# Patient Record
Sex: FEMALE
[De-identification: names, ages, dates, MRNs, and addresses within clinical notes are randomized per-mention and may not be internally consistent; named-entity substitution may affect disease eponyms.]

---

## 2017-09-15 ENCOUNTER — CHARTING TRANS (OUTPATIENT)
Dept: OTHER | Age: 82
End: 2017-09-15

## 2017-10-05 ENCOUNTER — HOSPITAL (OUTPATIENT)
Dept: OTHER | Age: 82
End: 2017-10-05
Attending: FAMILY MEDICINE

## 2018-11-02 VITALS
DIASTOLIC BLOOD PRESSURE: 70 MMHG | TEMPERATURE: 98.4 F | SYSTOLIC BLOOD PRESSURE: 118 MMHG | HEART RATE: 72 BPM | RESPIRATION RATE: 18 BRPM

## 2023-01-07 ENCOUNTER — APPOINTMENT (OUTPATIENT)
Dept: GENERAL RADIOLOGY | Age: 88
End: 2023-01-07
Payer: MEDICARE

## 2023-01-07 ENCOUNTER — APPOINTMENT (OUTPATIENT)
Dept: CT IMAGING | Age: 88
End: 2023-01-07
Payer: MEDICARE

## 2023-01-07 ENCOUNTER — HOSPITAL ENCOUNTER (EMERGENCY)
Age: 88
Discharge: HOME | End: 2023-01-07
Attending: STUDENT IN AN ORGANIZED HEALTH CARE EDUCATION/TRAINING PROGRAM
Payer: MEDICARE

## 2023-01-07 VITALS
BODY MASS INDEX: 20.91 KG/M2 | HEART RATE: 96 BPM | SYSTOLIC BLOOD PRESSURE: 145 MMHG | TEMPERATURE: 98.2 F | HEIGHT: 63 IN | DIASTOLIC BLOOD PRESSURE: 76 MMHG | RESPIRATION RATE: 16 BRPM | OXYGEN SATURATION: 95 % | WEIGHT: 118 LBS

## 2023-01-07 DIAGNOSIS — S61.411D LACERATION OF RIGHT HAND, FOREIGN BODY PRESENCE UNSPECIFIED, SUBSEQUENT ENCOUNTER: ICD-10-CM

## 2023-01-07 DIAGNOSIS — T83.511A URINARY TRACT INFECTION ASSOCIATED WITH CATHETERIZATION OF URINARY TRACT, UNSPECIFIED INDWELLING URINARY CATHETER TYPE, INITIAL ENCOUNTER (HCC): Primary | ICD-10-CM

## 2023-01-07 DIAGNOSIS — I44.7 LEFT BUNDLE BRANCH BLOCK: ICD-10-CM

## 2023-01-07 DIAGNOSIS — S42.431A CLOSED DISPLACED FRACTURE OF LATERAL EPICONDYLE OF RIGHT HUMERUS, UNSPECIFIED FRACTURE MORPHOLOGY, INITIAL ENCOUNTER: ICD-10-CM

## 2023-01-07 DIAGNOSIS — N39.0 URINARY TRACT INFECTION ASSOCIATED WITH CATHETERIZATION OF URINARY TRACT, UNSPECIFIED INDWELLING URINARY CATHETER TYPE, INITIAL ENCOUNTER (HCC): Primary | ICD-10-CM

## 2023-01-07 LAB
A/G RATIO: 1.3 (ref 1.1–2.2)
ALBUMIN SERPL-MCNC: 3.5 G/DL (ref 3.4–5)
ALP BLD-CCNC: 95 U/L (ref 40–129)
ALT SERPL-CCNC: 64 U/L (ref 10–40)
ANION GAP SERPL CALCULATED.3IONS-SCNC: 8 MMOL/L (ref 3–16)
AST SERPL-CCNC: 69 U/L (ref 15–37)
BACTERIA: ABNORMAL /HPF
BASOPHILS ABSOLUTE: 0 K/UL (ref 0–0.2)
BASOPHILS RELATIVE PERCENT: 0.5 %
BILIRUB SERPL-MCNC: 0.4 MG/DL (ref 0–1)
BILIRUBIN URINE: NEGATIVE
BLOOD, URINE: NEGATIVE
BUN BLDV-MCNC: 25 MG/DL (ref 7–20)
CALCIUM SERPL-MCNC: 8.8 MG/DL (ref 8.3–10.6)
CHLORIDE BLD-SCNC: 104 MMOL/L (ref 99–110)
CLARITY: CLEAR
CO2: 28 MMOL/L (ref 21–32)
COLOR: YELLOW
CREAT SERPL-MCNC: 0.8 MG/DL (ref 0.6–1.2)
EOSINOPHILS ABSOLUTE: 0.1 K/UL (ref 0–0.6)
EOSINOPHILS RELATIVE PERCENT: 0.9 %
EPITHELIAL CELLS, UA: ABNORMAL /HPF (ref 0–5)
GFR SERPL CREATININE-BSD FRML MDRD: >60 ML/MIN/{1.73_M2}
GLUCOSE BLD-MCNC: 113 MG/DL (ref 70–99)
GLUCOSE BLD-MCNC: 125 MG/DL (ref 70–99)
GLUCOSE URINE: NEGATIVE MG/DL
HCT VFR BLD CALC: 35 % (ref 36–48)
HEMOGLOBIN: 11.4 G/DL (ref 12–16)
KETONES, URINE: NEGATIVE MG/DL
LACTIC ACID: 1.3 MMOL/L (ref 0.4–2)
LEUKOCYTE ESTERASE, URINE: ABNORMAL
LYMPHOCYTES ABSOLUTE: 1 K/UL (ref 1–5.1)
LYMPHOCYTES RELATIVE PERCENT: 11 %
MCH RBC QN AUTO: 29.4 PG (ref 26–34)
MCHC RBC AUTO-ENTMCNC: 32.6 G/DL (ref 31–36)
MCV RBC AUTO: 90.2 FL (ref 80–100)
MICROSCOPIC EXAMINATION: YES
MONOCYTES ABSOLUTE: 0.8 K/UL (ref 0–1.3)
MONOCYTES RELATIVE PERCENT: 8.2 %
NEUTROPHILS ABSOLUTE: 7.6 K/UL (ref 1.7–7.7)
NEUTROPHILS RELATIVE PERCENT: 79.4 %
NITRITE, URINE: NEGATIVE
PDW BLD-RTO: 18.1 % (ref 12.4–15.4)
PERFORMED ON: ABNORMAL
PH UA: 6 (ref 5–8)
PLATELET # BLD: 209 K/UL (ref 135–450)
PMV BLD AUTO: 9.7 FL (ref 5–10.5)
POTASSIUM REFLEX MAGNESIUM: 3.9 MMOL/L (ref 3.5–5.1)
PROTEIN UA: NEGATIVE MG/DL
RBC # BLD: 3.88 M/UL (ref 4–5.2)
RBC UA: ABNORMAL /HPF (ref 0–4)
SODIUM BLD-SCNC: 140 MMOL/L (ref 136–145)
SPECIFIC GRAVITY UA: >=1.03 (ref 1–1.03)
TOTAL PROTEIN: 6.3 G/DL (ref 6.4–8.2)
URINE REFLEX TO CULTURE: YES
URINE TYPE: ABNORMAL
UROBILINOGEN, URINE: 0.2 E.U./DL
WBC # BLD: 9.5 K/UL (ref 4–11)
WBC UA: ABNORMAL /HPF (ref 0–5)

## 2023-01-07 PROCEDURE — 2580000003 HC RX 258

## 2023-01-07 PROCEDURE — 93005 ELECTROCARDIOGRAM TRACING: CPT

## 2023-01-07 PROCEDURE — 70450 CT HEAD/BRAIN W/O DYE: CPT

## 2023-01-07 PROCEDURE — 96366 THER/PROPH/DIAG IV INF ADDON: CPT

## 2023-01-07 PROCEDURE — 73522 X-RAY EXAM HIPS BI 3-4 VIEWS: CPT

## 2023-01-07 PROCEDURE — 80053 COMPREHEN METABOLIC PANEL: CPT

## 2023-01-07 PROCEDURE — 6360000002 HC RX W HCPCS

## 2023-01-07 PROCEDURE — 36415 COLL VENOUS BLD VENIPUNCTURE: CPT

## 2023-01-07 PROCEDURE — 73090 X-RAY EXAM OF FOREARM: CPT

## 2023-01-07 PROCEDURE — 73080 X-RAY EXAM OF ELBOW: CPT

## 2023-01-07 PROCEDURE — 99285 EMERGENCY DEPT VISIT HI MDM: CPT

## 2023-01-07 PROCEDURE — 73130 X-RAY EXAM OF HAND: CPT

## 2023-01-07 PROCEDURE — 85025 COMPLETE CBC W/AUTO DIFF WBC: CPT

## 2023-01-07 PROCEDURE — 73030 X-RAY EXAM OF SHOULDER: CPT

## 2023-01-07 PROCEDURE — 96365 THER/PROPH/DIAG IV INF INIT: CPT

## 2023-01-07 PROCEDURE — 87086 URINE CULTURE/COLONY COUNT: CPT

## 2023-01-07 PROCEDURE — 81001 URINALYSIS AUTO W/SCOPE: CPT

## 2023-01-07 PROCEDURE — 83605 ASSAY OF LACTIC ACID: CPT

## 2023-01-07 RX ORDER — SODIUM CHLORIDE, SODIUM LACTATE, POTASSIUM CHLORIDE, AND CALCIUM CHLORIDE .6; .31; .03; .02 G/100ML; G/100ML; G/100ML; G/100ML
500 INJECTION, SOLUTION INTRAVENOUS ONCE
Status: COMPLETED | OUTPATIENT
Start: 2023-01-07 | End: 2023-01-07

## 2023-01-07 RX ADMIN — CEFTRIAXONE 1000 MG: 1 INJECTION, POWDER, FOR SOLUTION INTRAMUSCULAR; INTRAVENOUS at 14:22

## 2023-01-07 RX ADMIN — SODIUM CHLORIDE, POTASSIUM CHLORIDE, SODIUM LACTATE AND CALCIUM CHLORIDE 500 ML: 600; 310; 30; 20 INJECTION, SOLUTION INTRAVENOUS at 12:59

## 2023-01-07 ASSESSMENT — PAIN - FUNCTIONAL ASSESSMENT
PAIN_FUNCTIONAL_ASSESSMENT: NONE - DENIES PAIN
PAIN_FUNCTIONAL_ASSESSMENT: 0-10

## 2023-01-07 ASSESSMENT — PAIN DESCRIPTION - ORIENTATION: ORIENTATION: RIGHT

## 2023-01-07 ASSESSMENT — ENCOUNTER SYMPTOMS
SORE THROAT: 1
EYES NEGATIVE: 1
ABDOMINAL PAIN: 0
APNEA: 0
CONSTIPATION: 0
DIARRHEA: 0
CHEST TIGHTNESS: 0
NAUSEA: 0
SHORTNESS OF BREATH: 0
CHOKING: 0
COUGH: 0
ABDOMINAL DISTENTION: 0
FACIAL SWELLING: 0

## 2023-01-07 ASSESSMENT — PAIN DESCRIPTION - DESCRIPTORS: DESCRIPTORS: ACHING

## 2023-01-07 ASSESSMENT — PAIN SCALES - GENERAL: PAINLEVEL_OUTOF10: 3

## 2023-01-07 ASSESSMENT — PAIN DESCRIPTION - LOCATION: LOCATION: WRIST

## 2023-01-07 NOTE — ED PROVIDER NOTES
ED Attending Attestation Note     Date of evaluation: 1/7/2023    This patient was seen by the resident. I have seen and examined the patient, agree with the workup, evaluation, management and diagnosis. The care plan has been discussed. I have reviewed the ECG and concur with the resident's interpretation. My assessment reveals very pleasant 51-year-old female with history of recent fall presenting from nursing facility for hand pain and episode of hypotension. Per patient, family at bedside and nursing report, patient had an unwitnessed fall with unknown loss of consciousness yesterday with injury to her right hand and head for which she was previously seen at outside hospital with x-ray that showed no acute fractures and no intracranial hemorrhage. Patient denied any chest pain, lightheadedness or dizziness prior to fall. Today, patient was noted at facility to have increased right hand pain and episode of low blood pressure with systolic blood pressure in the 70s for which EMS was called. Patient reports significant improvement in right hand pain denies any other discomfort at this time. Per daughter at bedside, patient's neurologic status is at her baseline though she states that her speech is slightly abnormal from her baseline but thinks this is due to dry mouth in setting of poor p.o. intake. On exam, patient is well-appearing in no acute distress. Patient is alert and oriented to person, hospital and month but not specific hospital or year. No gross cranial nerve abnormalities. 5/5 strength with dorsal and plantar flexion bilaterally with 4 -/5 strength in bilateral lower extremities proximally which patient's daughter says is at baseline which she uses a wheelchair. Exam of right hand with intact pulse motor and sensory function with dressing in place overlying Steri-Strips with no active bleeding. Trauma exam with no C-spine, chest wall, abdominal or pelvic tenderness.   Intact range of motion of bilateral lower extremities with slight decrease in range of motion of right arm due to pain at elbow. Labs with no leukocytosis with positive urinalysis in setting of known urinary tract infection being treated as outpatient. Patient not previously taken oral antibiotic today and was given dose of IV Rocephin while in the ER. Additional x-rays of the right upper extremity were obtained and showed no acute fractures of the right hand but did note small avulsion fracture to right humerus. Patient's right upper extremity was placed in sling and she was provided outpatient follow-up information with primary care doctor and orthopedic surgery. On repeat assessment, patient had controlled pain and was at baseline per family. Patient was discharged back to facility with wound care instructions and recommendation for outpatient follow-up. They were advised return to the ER for new or worsening symptoms.      Waldo Ness MD  01/08/23 5212

## 2023-01-07 NOTE — ED PROVIDER NOTES
THE [facility]  EMERGENCY DEPARTMENT ENCOUNTER          EM RESIDENT NOTE       Date of evaluation: 1/7/2023    Chief Complaint     Altered Mental Status (Pt presents from facility wit AMS. EMS states she was not answering questions about her pain as she would at baseline.)      History of Present Illness     Loyda Nolen is a 89 y.o. female who presents with AMS and hypotension. Of note, subjective information difficult to obtain 2/2 mentation. A majority of subjective information was obtained from daughter and nursing facility. Patient had an unwitnessed fall yesterday and was found down. The nursing facility staff then sent her to [facility] where she was worked up and found to have a UTI with no acute fractures and discharged on an oral course of cefuroxime. The facility states that patient was hypotensive to 74/53 this morning with a pain level of 7/10. This prompted the facility to call for EMS and send patient to [facility]. Patients daughter states that her mother is at baseline except having difficulty with pronunciation to which the daughter contributes a dry mouth. Patient also states that her mouth and throat feel significantly dry. Patient endorses right hand and elbow pain.     Review of Systems     Review of Systems   Constitutional:  Negative for chills, diaphoresis, fatigue and fever.   HENT:  Positive for sore throat. Negative for congestion and facial swelling.    Eyes: Negative.    Respiratory:  Negative for apnea, cough, choking, chest tightness and shortness of breath.    Cardiovascular:  Negative for chest pain and leg swelling.   Gastrointestinal:  Negative for abdominal distention, abdominal pain, constipation, diarrhea and nausea.   Endocrine: Negative.    Genitourinary:  Positive for difficulty urinating and dysuria.   Musculoskeletal:         Pain and ecchymosis noted to right elbow     Past Medical, Surgical, Family, and Social History     She has no past medical history on  file.  She has no past surgical history on file. Her family history is not on file. She reports that she has never smoked. She has never used smokeless tobacco.    Medications     Previous Medications    No medications on file       Allergies     She has No Known Allergies. Physical Exam     INITIAL VITALS: BP: 110/69, Temp: 98.2 °F (36.8 °C), Heart Rate: 96, Resp: 16, SpO2: 99 %   Physical Exam  Constitutional:       General: She is not in acute distress. Appearance: She is not ill-appearing. HENT:      Head: Normocephalic. Mouth/Throat:      Mouth: Mucous membranes are dry. Pharynx: Oropharynx is clear. Eyes:      Pupils: Pupils are equal, round, and reactive to light. Cardiovascular:      Rate and Rhythm: Normal rate and regular rhythm. Pulses: Normal pulses. Heart sounds: Normal heart sounds. Pulmonary:      Effort: Pulmonary effort is normal.      Breath sounds: Normal breath sounds. Abdominal:      General: Abdomen is flat. Bowel sounds are normal.      Palpations: Abdomen is soft. Musculoskeletal:         General: Swelling, tenderness and signs of injury present. Cervical back: Normal range of motion. No tenderness. Right lower leg: No edema. Left lower leg: No edema. Comments: Pain with passive ROM of right shoulder. Ecchymosis and edema noted to lateral aspect of right elbow. Pain upon palpation and passive ROM to right elbow. Neurovascular intact proximal and distal to the area of tenderness. Full-thickness laceration noted to dorsal aspect of right hand. Wound does not tunnel or track. No purulence, fluctuance, crepitus, or malodor noted. Per-incisional erythema and calor noted. Skin:     General: Skin is warm and dry. Neurological:      General: No focal deficit present. Mental Status: She is alert. Mental status is at baseline.        DiagnosticResults     EKG   Interpreted in conjunction with emergencydepartment physician Glo Gómez Karen Keys MD  Rhythm: normal sinus   Rate: normal  Axis: left  Ectopy: none  Conduction: left bundle branch block (complete)  ST Segments: Non-specific ST changes not meeting Sgarbossa criteria  T Waves:Non-specific change in AVL  Q Waves: none  Clinical Impression: left bundle branch block. Comparison:  No prior    RADIOLOGY:  XR HAND RIGHT (MIN 3 VIEWS)   Final Result      No radiographic evidence of acute fracture. Osteopenia. Severe degenerative changes of the interphalangeal joints. XR SHOULDER RIGHT (MIN 2 VIEWS)   Final Result      No radiographic evidence of acute fracture. Osteopenia. Right acromioclavicular severe arthrosis. XR ELBOW RIGHT (MIN 3 VIEWS)   Final Result      Suspected small avulsion fracture of humerus lateral epicondyle. XR RADIUS ULNA RIGHT (2 VIEWS)   Final Result      Suspected small avulsion fracture of humerus lateral epicondyle. XR HIP 3-4 VW W PELVIS BILATERAL   Final Result      No acute fracture seen. CT HEAD WO CONTRAST   Final Result      No acute intracranial abnormality.                 LABS:   Results for orders placed or performed during the hospital encounter of 01/07/23   CBC with Auto Differential   Result Value Ref Range    WBC 9.5 4.0 - 11.0 K/uL    RBC 3.88 (L) 4.00 - 5.20 M/uL    Hemoglobin 11.4 (L) 12.0 - 16.0 g/dL    Hematocrit 35.0 (L) 36.0 - 48.0 %    MCV 90.2 80.0 - 100.0 fL    MCH 29.4 26.0 - 34.0 pg    MCHC 32.6 31.0 - 36.0 g/dL    RDW 18.1 (H) 12.4 - 15.4 %    Platelets 646 607 - 064 K/uL    MPV 9.7 5.0 - 10.5 fL    Neutrophils % 79.4 %    Lymphocytes % 11.0 %    Monocytes % 8.2 %    Eosinophils % 0.9 %    Basophils % 0.5 %    Neutrophils Absolute 7.6 1.7 - 7.7 K/uL    Lymphocytes Absolute 1.0 1.0 - 5.1 K/uL    Monocytes Absolute 0.8 0.0 - 1.3 K/uL    Eosinophils Absolute 0.1 0.0 - 0.6 K/uL    Basophils Absolute 0.0 0.0 - 0.2 K/uL   CMP w/ Reflex to MG   Result Value Ref Range    Sodium 140 136 - 145 mmol/L    Potassium reflex Magnesium 3.9 3.5 - 5.1 mmol/L    Chloride 104 99 - 110 mmol/L    CO2 28 21 - 32 mmol/L    Anion Gap 8 3 - 16    Glucose 125 (H) 70 - 99 mg/dL    BUN 25 (H) 7 - 20 mg/dL    Creatinine 0.8 0.6 - 1.2 mg/dL    Est, Glom Filt Rate >60 >60    Calcium 8.8 8.3 - 10.6 mg/dL    Total Protein 6.3 (L) 6.4 - 8.2 g/dL    Albumin 3.5 3.4 - 5.0 g/dL    Albumin/Globulin Ratio 1.3 1.1 - 2.2    Total Bilirubin 0.4 0.0 - 1.0 mg/dL    Alkaline Phosphatase 95 40 - 129 U/L    ALT 64 (H) 10 - 40 U/L    AST 69 (H) 15 - 37 U/L   Urinalysis with Reflex to Culture    Specimen: Urine   Result Value Ref Range    Color, UA Yellow Straw/Yellow    Clarity, UA Clear Clear    Glucose, Ur Negative Negative mg/dL    Bilirubin Urine Negative Negative    Ketones, Urine Negative Negative mg/dL    Specific Gravity, UA >=1.030 1.005 - 1.030    Blood, Urine Negative Negative    pH, UA 6.0 5.0 - 8.0    Protein, UA Negative Negative mg/dL    Urobilinogen, Urine 0.2 <2.0 E.U./dL    Nitrite, Urine Negative Negative    Leukocyte Esterase, Urine SMALL (A) Negative    Microscopic Examination YES     Urine Type NotGiven     Urine Reflex to Culture Yes    Lactic Acid   Result Value Ref Range    Lactic Acid 1.3 0.4 - 2.0 mmol/L   Microscopic Urinalysis   Result Value Ref Range    WBC, UA 10-20 (A) 0 - 5 /HPF    RBC, UA None seen 0 - 4 /HPF    Epithelial Cells, UA 2-5 0 - 5 /HPF    Bacteria, UA Rare (A) None Seen /HPF   POCT Glucose   Result Value Ref Range    POC Glucose 113 (H) 70 - 99 mg/dl    Performed on ACCU-CHEK        ED BEDSIDE ULTRASOUND:  No ultrasound performed in ED    RECENT VITALS:  BP: (!) 145/76, Temp: 98.2 °F (36.8 °C), Heart Rate: 96,Resp: 16, SpO2: 95 %     Procedures     No procedure performed in ED    ED Course     Nursing Notes, Past Medical Hx, Past Surgical Hx, Social Hx, Allergies, and Family Hx were reviewed.     The patient was given the following medications:  Orders Placed This Encounter   Medications    lactated ringers bolus    cefTRIAXone (ROCEPHIN) 1,000 mg in dextrose 5 % 50 mL IVPB mini-bag     Order Specific Question:   Antimicrobial Indications     Answer:   Urinary Tract Infection       CONSULTS:  None    MEDICAL DECISION MAKING / ASSESSMENT / PLAN     Madison Castillo is a 80 y.o. female with past medical history as listed above who presented to the ED today for AMS, UTI, hypotension and hand pain s/p fall. Upon arrival patient was afebrile and all other vital signs stable. Upon physical examination patient had a full-thickness laceration to dorsal aspect of right hand along with pain with palpation. Ecchymosis and pain upon palpation and ROM of right elbow. Pain with ROM of right shoulder. Patient has pain upon palpation and ROM to right elbow and right shoulder. Labs are unremarkable, including normal lactic acid levels, no leukocytosis. Urinalysis shows small amounts of leukocyte esterase, 10-20 wbc and abnormal bacteria upon UA. Imaging was obtained that showed: Avulsion fracture of lateral epicondyle of right humerus. All other imaging was negative for acute pathology. EKG obtained in episode of hypertension, patient continues to denies chest pain, nausea, SOB, cough. The patient was given 500mL of LR and ice chips in the ED and obtained relief or dry mouth and sore throat. Furthermore, the patient was given IV Rocephin in the ED for UTI. At this time, the most likely diagnosis is laceration to dorsal aspect of right hand, avulsion fracture of lateral epicondyle, and UTI. I do not believe that this patient requires any further work-up or inpatient management for their complaints, and are appropriate for outpatient follow-up. I discussed the findings of my physical exam and work-up with the patient, and they were given the opportunity to ask questions. The patient is agreeable with the plan and is ready to be discharged. The patient was discharged back to facility with YASH danielson.  Patient instructed to follow-up with PCP Dr. Minh Barksdale and Dr. Michi Mc MD as soon as possible, and given strict return precautions. This patient was also evaluated by the attending physician. All care plans were discussed and agreed upon. Clinical Impression     1. Urinary tract infection associated with catheterization of urinary tract, unspecified indwelling urinary catheter type, initial encounter ([facility] [address]    2. Closed displaced fracture of lateral epicondyle of right humerus, unspecified fracture morphology, initial encounter    3. Laceration of right hand, foreign body presence unspecified, subsequent encounter    4.  Left bundle branch block        Disposition     PATIENT REFERRED TO:  Michaelsheri Kristel [address]  [address] [address] [phone]    In 3 days  Post hospital visit, As needed    MD Maranda Lange [phone] [facility] [phone]    Call in 1 week  Post-emergency room visit    DISCHARGE MEDICATIONS:  New Prescriptions    No medications on file       DISPOSITION Decision To Discharge 01/07/2023 05:11:22 PM      Jennifer Yap DPM  Resident  01/07/23 [id number]

## 2023-01-07 NOTE — DISCHARGE INSTRUCTIONS
Wound Care Discharge Instructions  Please perform every other day dressing changes to right upper extremity  -Apply adaptic or non-adherent layer to the wound on the top of the right hand   -Next apply dry sterile gauze to the top of the right hand  -Next wrap gauze roll around right hand, take care to not wrap too tightly. Please then apply tape    Please wear sling on right arm at all times when not sleeping    Please follow up with Dr. Susie Shanks MD for Orthopedic care. All instructions including contact information is included in follow up instructions    Please follow up with PCP within a week after discharge    Watch for signs and symptoms of infection including but not limited to fever, chills, feeling ill, redness around the wounds, redness streaking up the arm, purulent drainage.  Instructed patient to present to the nearest emergency department if they notice these signs or symptoms     Please complete full course of antibiotics for UTI    Return to the ER immediately for new or worsening symptoms including high fever, worsening abdominal or flank pain, confusion, numbness weakness or tingling in your hand or other symptoms that are concerning to you

## 2023-01-08 LAB
EKG ATRIAL RATE: 93 BPM
EKG DIAGNOSIS: NORMAL
EKG P AXIS: 84 DEGREES
EKG P-R INTERVAL: 190 MS
EKG Q-T INTERVAL: 422 MS
EKG QRS DURATION: 120 MS
EKG QTC CALCULATION (BAZETT): 524 MS
EKG R AXIS: -19 DEGREES
EKG T AXIS: 100 DEGREES
EKG VENTRICULAR RATE: 93 BPM
URINE CULTURE, ROUTINE: NORMAL

## 2023-01-08 NOTE — ED NOTES
Discharge instructions given per provider order. Family verbalized understanding and requested to take patient home in private vehivle rather than wait for EMS.         Winsome Winkler RN  01/07/23 [id number]

## 2023-09-23 ENCOUNTER — APPOINTMENT (OUTPATIENT)
Dept: CT IMAGING | Age: 88
End: 2023-09-23
Payer: MEDICARE

## 2023-09-23 ENCOUNTER — HOSPITAL ENCOUNTER (EMERGENCY)
Age: 88
Discharge: HOME | End: 2023-09-23
Attending: EMERGENCY MEDICINE
Payer: MEDICARE

## 2023-09-23 VITALS
HEART RATE: 87 BPM | WEIGHT: 115 LBS | DIASTOLIC BLOOD PRESSURE: 87 MMHG | SYSTOLIC BLOOD PRESSURE: 177 MMHG | BODY MASS INDEX: 20.38 KG/M2 | OXYGEN SATURATION: 94 % | RESPIRATION RATE: 16 BRPM | HEIGHT: 63 IN | TEMPERATURE: 98.2 F

## 2023-09-23 DIAGNOSIS — S01.81XA CHIN LACERATION, INITIAL ENCOUNTER: Primary | ICD-10-CM

## 2023-09-23 PROCEDURE — 12011 RPR F/E/E/N/L/M 2.5 CM/<: CPT

## 2023-09-23 PROCEDURE — 99284 EMERGENCY DEPT VISIT MOD MDM: CPT

## 2023-09-23 PROCEDURE — 70450 CT HEAD/BRAIN W/O DYE: CPT

## 2023-09-23 RX ORDER — LEVOTHYROXINE SODIUM 0.03 MG/1
TABLET ORAL
COMMUNITY
Start: 2023-08-10

## 2023-09-23 RX ORDER — ONDANSETRON 4 MG/1
TABLET, FILM COATED ORAL
COMMUNITY
Start: 2023-08-10

## 2023-09-23 RX ORDER — DEXTRAN 70, GLYCERIN, HYPROMELLOSE 1; 2; 3 MG/ML; MG/ML; MG/ML
SOLUTION/ DROPS OPHTHALMIC
COMMUNITY

## 2023-09-23 RX ORDER — ACETAMINOPHEN 325 MG/1
650 TABLET ORAL EVERY 4 HOURS PRN
COMMUNITY

## 2023-09-23 RX ORDER — DOCUSATE SODIUM 100 MG/1
100 CAPSULE, LIQUID FILLED ORAL DAILY
COMMUNITY

## 2023-09-23 NOTE — DISCHARGE INSTRUCTIONS
Return to ED for worsening symptoms or other concerns. Followup with primary doctor in 2-3 days for recheck. Skin glue on the chin should fall off on its own in about 5 days; don't apply antibiotic ointment to the area as it will weaken it too fast.  Keep wound clean and dry otherwise.

## 2023-09-23 NOTE — ED NOTES
Patient prepared for and ready to be discharged. Dressed in clothes and given belongings. Patient discharged at this time in no acute distress. [facility] to take patient back to nursing home.       Keshav Ramírez RN  09/23/23 7417